# Patient Record
Sex: MALE | Race: WHITE
[De-identification: names, ages, dates, MRNs, and addresses within clinical notes are randomized per-mention and may not be internally consistent; named-entity substitution may affect disease eponyms.]

---

## 2017-12-05 ENCOUNTER — HOSPITAL ENCOUNTER (OUTPATIENT)
Dept: HOSPITAL 45 - C.LAB1850 | Age: 47
Discharge: HOME | End: 2017-12-05
Attending: FAMILY MEDICINE
Payer: COMMERCIAL

## 2017-12-05 DIAGNOSIS — Z87.898: Primary | ICD-10-CM

## 2017-12-05 LAB
ANION GAP SERPL CALC-SCNC: 6 MMOL/L (ref 3–11)
BASOPHILS # BLD: 0.02 K/UL (ref 0–0.2)
BASOPHILS NFR BLD: 0.3 %
BUN SERPL-MCNC: 25 MG/DL (ref 7–18)
BUN/CREAT SERPL: 24.6 (ref 10–20)
CALCIUM SERPL-MCNC: 8.3 MG/DL (ref 8.5–10.1)
CHLORIDE SERPL-SCNC: 104 MMOL/L (ref 98–107)
CHOLEST/HDLC SERPL: 4.7 {RATIO}
CO2 SERPL-SCNC: 26 MMOL/L (ref 21–32)
COMPLETE: YES
CREAT SERPL-MCNC: 1 MG/DL (ref 0.6–1.4)
EOSINOPHIL NFR BLD AUTO: 161 K/UL (ref 130–400)
GLUCOSE SERPL-MCNC: 93 MG/DL (ref 70–99)
GLUCOSE UR QL: 36 MG/DL
HCT VFR BLD CALC: 44.3 % (ref 42–52)
IG%: 0.3 %
IMM GRANULOCYTES NFR BLD AUTO: 27.8 %
KETONES UR QL STRIP: 108 MG/DL
LYMPHOCYTES # BLD: 2.06 K/UL (ref 1.2–3.4)
MCH RBC QN AUTO: 29.7 PG (ref 25–34)
MCHC RBC AUTO-ENTMCNC: 35 G/DL (ref 32–36)
MCV RBC AUTO: 84.9 FL (ref 80–100)
MONOCYTES NFR BLD: 8.2 %
NEUTROPHILS # BLD AUTO: 3.2 %
NEUTROPHILS NFR BLD AUTO: 60.2 %
NITRITE UR QL STRIP: 121 MG/DL (ref 0–150)
PH UR: 168 MG/DL (ref 0–200)
PMV BLD AUTO: 9.1 FL (ref 7.4–10.4)
POTASSIUM SERPL-SCNC: 4.1 MMOL/L (ref 3.5–5.1)
RBC # BLD AUTO: 5.22 M/UL (ref 4.7–6.1)
SODIUM SERPL-SCNC: 136 MMOL/L (ref 136–145)
VERY LOW DENSITY LIPOPROT CALC: 24 MG/DL
WBC # BLD AUTO: 7.42 K/UL (ref 4.8–10.8)

## 2018-01-26 ENCOUNTER — HOSPITAL ENCOUNTER (OUTPATIENT)
Dept: HOSPITAL 45 - C.RDSM | Age: 48
Discharge: HOME | End: 2018-01-26
Attending: FAMILY MEDICINE
Payer: COMMERCIAL

## 2018-01-26 DIAGNOSIS — M25.561: Primary | ICD-10-CM

## 2018-01-26 NOTE — DIAGNOSTIC IMAGING REPORT
R KNEE 4 OR MORE



CLINICAL HISTORY: RIGHT KNEE PAIN pain



COMPARISON: None.



DISCUSSION: Moderate degenerative narrowing of all major joint compartments.

Very small joint effusion. Small osteophytes from the superior and inferior

aspects of the patella. No significant fat fluid level. There is no evidence for

soft tissue swelling.



IMPRESSION: Moderate degenerative change all major joint compartments. Small

joint effusion.











The above report was generated using voice recognition software.  It may contain

grammatical, syntax or spelling errors.







Electronically signed by:  Gregor Pozo M.D.

1/26/2018 2:22 PM



Dictated Date/Time:  1/26/2018 2:21 PM

## 2018-02-02 ENCOUNTER — HOSPITAL ENCOUNTER (OUTPATIENT)
Dept: HOSPITAL 45 - C.MRI | Age: 48
Discharge: HOME | End: 2018-02-02
Attending: FAMILY MEDICINE
Payer: COMMERCIAL

## 2018-02-02 DIAGNOSIS — M71.21: ICD-10-CM

## 2018-02-02 DIAGNOSIS — S83.281A: ICD-10-CM

## 2018-02-02 DIAGNOSIS — S83.241A: Primary | ICD-10-CM

## 2018-02-02 DIAGNOSIS — M25.461: ICD-10-CM

## 2018-02-02 DIAGNOSIS — M23.8X1: ICD-10-CM

## 2018-02-02 DIAGNOSIS — Z91.048: ICD-10-CM
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
## 2018-02-02 DIAGNOSIS — Z88.1: ICD-10-CM

## 2018-02-02 NOTE — DIAGNOSTIC IMAGING REPORT
MRI OF THE  RIGHT KNEE



CLINICAL HISTORY:  Right knee injury.



COMPARISON STUDY: Radiograph of the right knee dated 1/26/2018.



TECHNIQUE: MRI of the right knee was performed utilizing proton density, T1, and

T2-weighted sequences in the axial, sagittal, coronal planes. IV contrast was

not administered for this examination.



FINDINGS:



Menisci: There is extensive tearing/maceration involving the medial meniscus

which is extruded. There is a free edge tear involving the lateral meniscus.



Ligaments: The anterior and posterior cruciate ligaments are intact. The medial

collateral ligament is intact. There is significant thickening and irregularity

of the fibular collateral ligament which is at least partially torn near the

femoral insertion. There is subchondral cystic change and marrow edema in the

underlying femoral condyle and this is likely chronic. There is also

tendinopathy with partial thickness tearing of the popliteus tendon in this

region. The iliotibial band is preserved.



Extensor mechanism: The extensor mechanism is intact. Hoffa's fat pad is normal

in appearance.



Articular cartilage and bone: As noted above, there is significant marrow edema

and subchondral cyst formation identified within the peripheral aspect of the

lateral femoral condyle. This is likely related to chronic injury/degenerative

change. There is significant marrow edema also seen within the medial femoral

condyle, greatest along the weightbearing surface. Marginal osteophytes are

noted and there are small patellar enthesophytes. There is mild thinning of the

articular cartilage and patellofemoral compartment. A small focus of

full-thickness cartilage loss is seen along the inferior aspect of the medial

patellar facet on axial image #17. There is also full-thickness cartilage loss

seen in the underlying medial femoral trochlea on axial image #16. There is

greater than 50% thinning of the articular cartilage along the weightbearing

surface in the medial and lateral compartments. There is nearly full-thickness

loss on the weightbearing surface in the medial compartment.



Joint effusion: A moderate joint effusion is identified.



Soft tissues: The musculature surrounding the knee joint is normal in bulk and

signal intensity. A tiny popliteal cyst is noted.





IMPRESSION: 



1. There is maceration with extensive degenerative tearing and extrusion of the

medial meniscus.



2. A free edge tear is seen involving the lateral meniscus.



3. The cruciate ligaments and the medial collateral ligament are intact.



4. There is chronic appearing tearing of the fibular collateral ligament,

greatest at the femoral insertion. There is also tearing of the popliteus tendon

at this site. Marrow edema and subchondral cystic change is present within the

peripheral aspect of the underlying lateral femoral condyle and these findings

are likely chronic. 



5. Additional foci of arthritic change as above with significant marrow edema

within the medial femoral condyle.



6. Joint effusion and tiny popliteal cyst.







Electronically signed by:  Les Eduardo M.D.

2/2/2018 8:51 AM



Dictated Date/Time:  2/2/2018 8:41 AM

## 2018-02-09 ENCOUNTER — HOSPITAL ENCOUNTER (OUTPATIENT)
Dept: HOSPITAL 45 - C.RDSM | Age: 48
Discharge: HOME | End: 2018-02-09
Attending: ORTHOPAEDIC SURGERY
Payer: COMMERCIAL

## 2018-02-09 DIAGNOSIS — M25.561: Primary | ICD-10-CM

## 2018-04-05 ENCOUNTER — HOSPITAL ENCOUNTER (OUTPATIENT)
Dept: HOSPITAL 45 - X.SURG | Age: 48
Discharge: HOME | End: 2018-04-05
Attending: ORTHOPAEDIC SURGERY
Payer: COMMERCIAL

## 2018-04-05 VITALS
BODY MASS INDEX: 40.26 KG/M2 | BODY MASS INDEX: 40.26 KG/M2 | HEIGHT: 65.98 IN | WEIGHT: 250.53 LBS | HEIGHT: 65.98 IN | WEIGHT: 250.53 LBS

## 2018-04-05 VITALS — HEART RATE: 96 BPM | OXYGEN SATURATION: 92 % | DIASTOLIC BLOOD PRESSURE: 81 MMHG | SYSTOLIC BLOOD PRESSURE: 119 MMHG

## 2018-04-05 VITALS — TEMPERATURE: 97.88 F

## 2018-04-05 DIAGNOSIS — Z88.1: ICD-10-CM

## 2018-04-05 DIAGNOSIS — Z85.820: ICD-10-CM

## 2018-04-05 DIAGNOSIS — F42.8: ICD-10-CM

## 2018-04-05 DIAGNOSIS — F32.9: ICD-10-CM
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
## 2018-04-05 DIAGNOSIS — E66.9: ICD-10-CM

## 2018-04-05 DIAGNOSIS — S83.231A: Primary | ICD-10-CM

## 2018-04-05 DIAGNOSIS — G47.33: ICD-10-CM

## 2018-04-05 DIAGNOSIS — Z82.49: ICD-10-CM

## 2018-04-05 DIAGNOSIS — Z98.890: ICD-10-CM

## 2018-04-05 DIAGNOSIS — M94.8X6: ICD-10-CM

## 2018-04-05 DIAGNOSIS — S83.281A: ICD-10-CM

## 2018-04-05 DIAGNOSIS — Z79.899: ICD-10-CM

## 2018-04-05 DIAGNOSIS — F41.9: ICD-10-CM

## 2018-04-05 DIAGNOSIS — I10: ICD-10-CM

## 2018-04-05 DIAGNOSIS — Z83.3: ICD-10-CM

## 2018-04-05 DIAGNOSIS — M08.20: ICD-10-CM

## 2018-04-05 DIAGNOSIS — M06.9: ICD-10-CM

## 2018-04-05 RX ADMIN — FENTANYL CITRATE PRN MCG: 50 INJECTION, SOLUTION INTRAMUSCULAR; INTRAVENOUS at 12:12

## 2018-04-05 RX ADMIN — FENTANYL CITRATE PRN MCG: 50 INJECTION, SOLUTION INTRAMUSCULAR; INTRAVENOUS at 12:19

## 2018-04-05 RX ADMIN — FENTANYL CITRATE PRN MCG: 50 INJECTION, SOLUTION INTRAMUSCULAR; INTRAVENOUS at 12:07

## 2018-04-05 RX ADMIN — FENTANYL CITRATE PRN MCG: 50 INJECTION, SOLUTION INTRAMUSCULAR; INTRAVENOUS at 12:01

## 2018-04-05 NOTE — [facility] OPERATIVE REPORT
Operative Report


Operative Date


Apr 5, 2018.





Pre-Operative Diagnosis





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





Post-Operative Diagnosis





Same as pre-op





Procedure(s) Performed





Right Knee Arthroscopy, Chondroplasty, Partial Lateral and Medial Meniscectomy 


and Synovectomy





Surgeon








Assistant Surgeon(s)


Ladonna TEE





Estimated Blood Loss


20ML





Findings


Grade 2 and possibly grade 3 chondrosis trochlea 22 cm.  Normal appearance of 

the patella.  Diffuse synovitis.  Fraying and tearing of the inner rim of the 

lateral meniscus.  Grade 3 chondrosis of the lateral femoral condyle 

weightbearing area 1.51 cm.  Complex irreparable medial meniscus tear with 

grade 1 chondrosis of the tibial plateau and diffuse grade 2-3 chondrosis of 

the medial femoral condyle.





Specimens





None





Anesthesia


Laryngeal mask





Complication(s)


None





Disposition


Recovery Room / PACU





Indications


Patient's a 47-year-old male.  He is status post a work-related injury to his 

right knee.  He has chondrosis of the knee and injury to the medial meniscus.  

He also has rheumatoid arthritis.  His medications have been held in 

coordination with his rheumatologist preoperatively and will will be begun 2 

weeks postoperatively.





Description of Procedure


Informed consent was obtained.  The patient was identified as Branden Rothman.  

The operative site was identified as the right knee which I marked with my 

initials.  A preop dose of IV antibiotics was given.  The patient was 

positioned supine on the operating room table.  A preoperative surgical timeout 

was performed.  The examination under anesthesia showed range of motion 0 to 

about 110 with intact cruciate and collateral ligaments with no significant 

effusion or bogginess..  A lateral post was used for stressing the knee.  No 

tourniquet was applied.  DVT prophylaxis intraoperatively with foot pumps.  A 

laryngeal mask anesthetic was administered.  Prior to start of the operation 1% 

lidocaine with epinephrine was injected into the knee joint, fat pad and portal 

sites.





Inferolateral viewing portal superior lateral outflow portal and inferomedial 

working portal were established.  Diagnostic arthroscopy was performed.  

Diffuse synovitis likely secondary to rheumatoid arthritis was identified 

throughout the knee suprapatellar pouch anterior compartment posterior medial 

lateral gutters.  It was debrided throughout the knee worked as were 

encountered except for the posterior medial lateral compartments.  The cruciate 

ligaments were normal.  The patella was normal.  There is an area approximately 

1.5-2 cm of grade 2 perhaps grade 3 chondrosis of the trochlea but not grade 4.

  This was in the lower central portion of the trochlea.  There were no loose 

bodies.  The lateral compartment showed fraying and tearing of the inner rim of 

the lateral meniscus which was debrided with the shaver.  There was a 5-7 mm 

rim width to the popliteal hiatus.  There was synovitis encroaching into the 

lateral compartment under and on top of the meniscus which was debrided.  The 

meniscal roots were intact.  The lateral tibial plateau had some grade 1 

softening.  The lateral femoral condyle was normal except for an area towards 

the posterior weightbearing area which had grade 2 and 3 chondrosis over an 

area of 1.51 cm.  Chondroplasty performed.  The posterior lateral view showed 

synovitis but no peripheral meniscus tear.  Chondroplasty of the trochlea was 

performed.  Synovectomy of the medial lateral gutters was performed.  

Hemostasis with cold cut.  The medial compartment showed a complex unstable 

tear of the medial meniscus involving the body.  The roots were intact.  

Partial medial meniscectomy was performed with basket forceps and a motorized 

shaver.  There was diffuse grade 2 and perhaps some areas of grade 3 chondrosis 

but no grade 4 changes noted on the weightbearing area of the femoral condyle 

essentially the entire weightbearing area about 2-2-1/2 cm wide and 3 cm in 

length.  Posterior medial compartment showed synovitis but no peripheral tear.  

Cold cut was utilized to prevent hemarthrosis by electrocauterizing any 

bleeding surfaces at the conclusion of the surgery.  





The orthoscopic instruments removed from the knee.  The portals were closed 

with 4-0 nylon.  The leg was cleaned with wet and dry sponges.  A soft sterile 

dressing was applied.  Patient was awakened from anesthesia without difficulty 

and taken to recovery in stable condition.  There were no specimens or 

complications.  Counts were correct at the case.  Blood loss was minimal.  At 

the conclusion of the operation I spoke to the patient's family and informed 

them of my findings.  Detailed postoperative instructions were given.  The 

patient will be rehabilitated according to the meniscectomy and chondroplasty 

protocol.  The patient will begin aspirin for DVT prophylaxis the night of 

surgery.





The medial compartment was tight and access was improved by perforating the MCL 

percutaneously with an 18-gauge spinal needle.


I attest to the content of the Intraoperative Record and any orders documented 

therein.  Any exceptions are noted below.

## 2018-04-05 NOTE — ANESTHESIA PROGRESS NT - [facility]
Anesthesia Post Op Note


Date & Time


Apr 5, 2018 at 12:16





Vital Signs


Pain Intensity:  6.0





Vital Signs Past 12 Hours








  Date Time  Temp Pulse Resp B/P (MAP) Pulse Ox O2 Delivery O2 Flow Rate FiO2


 


4/5/18 12:01    121/92    


 


4/5/18 11:59  95 15  95   


 


4/5/18 11:59  96 15     


 


4/5/18 11:56    144/85    


 


4/5/18 11:54  90 17     


 


4/5/18 11:54  90 17  93   


 


4/5/18 11:51    122/85    


 


4/5/18 11:49  91 14  93   


 


4/5/18 11:49  91 14     


 


4/5/18 11:46    110/83    


 


4/5/18 11:44  91 16     


 


4/5/18 11:44  91 16  91   


 


4/5/18 11:43  95 18     


 


4/5/18 11:43  94 18  90   


 


4/5/18 11:41    121/73    


 


4/5/18 11:38  91 18     


 


4/5/18 11:38  91 18  88   


 


4/5/18 11:36    111/70    


 


4/5/18 11:33  93 17     


 


4/5/18 11:33  95 17  90   


 


4/5/18 11:31    122/87    


 


4/5/18 11:30 36.6 98 20 123/81 93 Mask 10 


 


4/5/18 11:29    123/81    


 


4/5/18 11:28  91      


 


4/5/18 11:28  91   87   


 


4/5/18 09:03 36.9 98 16 137/90 (106) 100 Room Air  











Notes


Mental Status:  alert / awake / arousable, participated in evaluation


Pt Amnestic to Procedure:  Yes


Nausea / Vomiting:  adequately controlled


Pain:  adequately controlled


Airway Patency, RR, SpO2:  stable & adequate


BP & HR:  stable & adequate


Hydration State:  stable & adequate


Anesthetic Complications:  no major complications apparent

## 2018-04-05 NOTE — [facility] OPERATIVE REPORT
Operative Report


Operative Date


Apr 5, 2018.





Pre-Operative Diagnosis





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





Post-Operative Diagnosis





Same as pre-op





Procedure(s) Performed





Right Knee Arthroscopy, Chondroplasty, Partial Lateral and Medial Meniscectomy 


and Synovectomy





Surgeon








Assistant Surgeon(s)


Ladonna TEE





Estimated Blood Loss


20ML





Findings


Chondrosis, synovitis, medial and lateral meniscal tears





Specimens





None





Drains


None





Anesthesia Type


General





Complication(s)


none





Disposition


no


Recovery Room / PACU





Indications


Patient is a 47-year-old male with complaints of right knee pain.  He is 

progressively worsened over the last few months.  X-rays were taken and was 

found to have some mild degenerative changes of his right knee.  MRI was taken 

and found to have a medial lateral meniscal tear.  He is failed conservative 

treatment.  Due to the failure of conservative treatment, surgical intervention 

was discussed.  He wished to proceed with surgery.  Risks and complications of 

surgery were explained to the patient and informed consent was obtained.





Description of Procedure


Patient was taken to the operating room and placed under general anesthesia.  

He was given 2 g of IV Ancef for surgical prophylaxis.  Timeout was performed.  

He was prepped and draped in routine sterile fashion.  I was present during the 

entire case, please see Dr. Pelletier's operative report for further detail.  

Patient was awakened and transferred to the recovery room in stable condition.


I attest to the content of the Intraoperative Record and any orders documented 

therein.  Any exceptions are noted below.

## 2018-04-05 NOTE — [facility] POST OPERATIVE BRIEF NOTE
Immediate Operative Summary


Operative Date


Apr 5, 2018.





Pre-Operative Diagnosis





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





Post-Operative Diagnosis





Same as pre-op





Procedure(s) Performed





Right Knee Arthroscopy, Chondroplasty, Partial Lateral and Medial Meniscectomy 


and Synovectomy





Surgeon








Assistant Surgeon(s)


Ladonna TEE





Estimated Blood Loss


20ML





Findings


Consistent with Post-Op Diagnosis





Specimens





None





Drains


None





Anesthesia Type


General





Complication(s)


none





Disposition


Accompanied Pt To Recovery:  no


Disposition:  Recovery Room / PACU

## 2018-04-05 NOTE — DISCHARGE INSTRUCTIONS-[facility]
Discharge Instructions


Date of Service


Apr 5, 2018.





Visit


Reason for Visit:  Right Knee Chondrosis With Medial Lateral Meniscus





Discharge


Discharge Diagnosis / Problem:  Right knee chondrosis, synovitis, medial and 

lateral meniscal tears





Discharge Goals


Goal(s):  Decrease discomfort, Improve function, Increase independence





Medications


Stopped Medications Name(s):  


stopped blood thinners


Restart Stopped Medication(s):


Please resume her methotrexate and Remicade as indicated by her rheumatologist.

  Hold for at least 2 weeks after surgery.  May resume as of April 20, 2018.





Activity Recommendations


Activity Limitations:  per Instructions/Follow-up section


Weightbearing Status:  Right weightbearing (as tolerated)





Anesthesia


.





Post Anesthesia Instructions:





If you have had General Anesthesia or IV Sedation:





*  Do not drive today.


*  Resume driving when surgeon permits.


*  Do not make important decisions or sign legal documents today.


*  Call surgeon for:





   1.  Temperature elevations greater than 101 degrees F.


   2.  Uncontrollable pain.


   3.  Excessive bleeding.


   4.  Persistent nausea and vomiting.


   5.  Medication intolerance (nausea, vomiting or rash).





*  For nausea and vomiting use only clear liquids such as: tea, soda, bouillon 

until nausea subsides, then gradually increase diet as tolerated.





*  If you have any concerns or questions, call your surgeon's office.  If 

physician is unavailable and it is an emergency, call 911 or go to the nearest 

emergency room.





.





Instructions / Follow-Up


Instructions / Follow-Up


The following are instructions to follow after  your Arthroscopic Knee Surgery.





ACTIVITY RECOMMENDATIONS:





*  Minimize activity until your first visit after surgery.





*  No excessive walking, jogging, sports or laboring.





*  Return to activity is individualized.  Most patients are able to return to 

every day activities


   within one month.





*  Return to sports or intensive labor usually occurs at 2-3 months.





*  Driving is not permitted until at least your first postoperative visit at a 

minimum.  Please 


   ask your doctor when it is safe to resume driving.  If you have an automatic 

vehicle and 


   your left leg has been operated on, then you may begin driving as soon as 

you are 


   comfortable and can drive safely.








SCHOOL/WORK RECOMMENDATIONS:





*  You may return to sedentary work or school when you are feeling more 

comfortable.  This


    is usually 3-7 days after surgery.  





*  Expect increased discomfort with increased activity.  Continue to elevate 

and ice the leg 


   as much as possible.








MEDICATIONS:





*  You will have a prescription for pain medication and an anti-inflammatory 

medication after surgery.





*  Use the pain medication for severe pain and the anti-inflammatory for less 

severe pain.  Once the 


   pain medication has run out, try to use the anti-inflammatory medication.  

If this is not effective, 


   contact the office [phone] for assistance.





*  The pain medication may cause nausea, constipation and drowsiness.  You 

should see how they 


   affect you before driving or similar activity.





*  The anti-inflammatory medication may cause stomach upset and bleeding.  If 

this occurs let your 


   doctor know immediately [phone].  





*  Take a stool softener like Colace or a laxative like Senokot to prevent 

constipation.








DIET:





*  Resume previous diet.








SPECIAL CARE:





ICE:  You have the option of an ice cooler, gel packs or ice bags. 





*   If you have an ice cooler, refer to the instructions for that device.  The 

ice cooler may 


    be used continuously.





*  If you do not have an ice cooler, you will need to use ice bags or gel 

packs.  Do not 


   apply ice directly to the skin.  Use a thin dressing or rubio shirt between 

the skin and ice


   bag.  Apply ice for 20-30 minutes and repeat every 2-4 hours.  This is 

especially important


   for the first 7-10 days after surgery.  Once the pain improves, use ice as 

needed.  





ELEVATION:





*  Keep your leg elevated at or above the level of your heart as much as 

possible.





*  Expect some increased discomfort and swelling if you are standing for any 

length of time.





*  When lying down, avoid placing anything under your knee.  Rather, prop your 

leg up by placing


   several pillows under your heel or calf.





DRESSING:





*  Your dressing will be changed at your first therapy appointment 

approximately 4-5 days after 


   surgery.  Band-aids, tape strips or gauze may be applied.  You may then 

change your dressing


   daily.





*  Reapply dressing followed by the Ace wrap or Tubi- stockinet and EBIce 

cooling pad (if chosen).  





*  Always wash your hands prior to touching the incision area.





*  Once the stitches are removed, you may leave the wound open to air or cover 

with an Ace wrap 


   or Tubi- stockinet.





*  If you have been given a white elastic stocking (LIONEL hose), wear as much as 

possible for the first


   1-3 weeks depending on swelling.





*  Expect some bloody drainage for the first few days after surgery.





*  Leave the tape strips, if present, in place for 5-7 days.





*  Band-aids and gauze may be changed daily.





CRUTCHES:





*  You will need to use crutches after surgery.





*  You may gradually progress to full weight bearing as tolerated and wean off 

the crutches


   unless otherwise advised.





*  Your therapist can provide assistance weaning off crutches.





*  Patients who have a microfracture done may need to be toe-touch weight-

bearing for 


   4-6 weeks.





BATHING:





*  You may shower or sponge-bathe immediately after surgery.  





*  The dressing will need to be covered with a plastic bag or plastic wrap 

until the dressing


    is changed on the fourth or fifth day after surgery. 





*  Once the dressing has been changed on the fourth or fifth day after surgery, 

you may 


   shower and get the incision wet.





*  Wash with regular soap and water.  





*  Do not bathe (submerge the incision), soak, swim or use a hot tub until the 

incision is 


   completely healed over with normal skin and the doctor has given the OK to 

proceed.





*  There is no need to apply any ointments, powders or salves to your incision.





*  Do not apply alcohol or hydrogen peroxide directly to the incision.





*  Diluted peroxide (50:50 mixture with sterile saline) may be used to clean 

dried blood from


   around the incision area.





BRACE:





*  Bracing is generally not needed after routine Arthroscopic Knee surgery.





THERAPY:





*  You will begin therapy four or five days after surgery.  





*  Organized therapy with the therapist is important for the first 4-6 weeks 

after surgery.  


   During that time you will attend therapy 1-3 times per week.  





*  You will also need to do daily exercises for range of motion and strength as 

instructed.








PROBLEMS/QUESTIONS:





*  If you have any problems such as severe pain, numbness, tingling or high 

fevers or if you 


   have any questions, please contact the office at [phone].





*  It is not uncommon to have some numbness and tingling after the surgery 

especially if you


   have had a nerve block done.  This should gradually improve over the first 1-

2 days.  If this 


   persists longer or worsens please contact the office.








FOLLOW UP VISIT:





*  If not already scheduled, please call the office at [phone] to 

schedule a follow-up


   appointment for 10 days, 6 weeks and 3 months after surgery.





*You have a follow-up appointment with Dr. Pelletier on April 16, 2018 at 11:45 

AM.





*You have a physical therapy appointment on April 9, 2018 at 2:00 PM.





Diet Recommendations


Home Diet:  no limitations, resume previous diet





Procedures


Procedures Performed:  


Right Knee Arthroscopy, Chondroplasty, Partial Lateral and Medial Meniscectomy 


and Synovectomy





Pending Studies


Studies pending at discharge:  no





Medical Emergencies








.


Who to Call and When:





Medical Emergencies:  If at any time you feel your situation is an emergency, 

please call 911 immediately.





.





Non-Emergent Contact


Non-Emergency issues call your:  Surgeon


Call Non-Emergent contact if:  temperature is above 100.5, your pain is not 

controlled, wound has increased drainage, wound has increased redness, wound 

has increased pain, you have any medication questions





.


.








"Provider Documentation" section prepared by Lenora Sutherland.








.





[place] Drug Monitoring Program


Search Results:  patient reviewed within database, no issues identified